# Patient Record
Sex: FEMALE | Race: ASIAN | URBAN - METROPOLITAN AREA
[De-identification: names, ages, dates, MRNs, and addresses within clinical notes are randomized per-mention and may not be internally consistent; named-entity substitution may affect disease eponyms.]

---

## 2017-03-02 ENCOUNTER — OFFICE VISIT (OUTPATIENT)
Dept: INTERNAL MEDICINE | Facility: CLINIC | Age: 52
End: 2017-03-02
Payer: COMMERCIAL

## 2017-03-02 VITALS
WEIGHT: 119 LBS | HEART RATE: 86 BPM | DIASTOLIC BLOOD PRESSURE: 90 MMHG | TEMPERATURE: 98.5 F | OXYGEN SATURATION: 99 % | HEIGHT: 60 IN | SYSTOLIC BLOOD PRESSURE: 110 MMHG | BODY MASS INDEX: 23.36 KG/M2

## 2017-03-02 DIAGNOSIS — M81.0 OSTEOPOROSIS: ICD-10-CM

## 2017-03-02 DIAGNOSIS — Z00.00 ENCOUNTER FOR ROUTINE ADULT HEALTH EXAMINATION WITHOUT ABNORMAL FINDINGS: Primary | ICD-10-CM

## 2017-03-02 DIAGNOSIS — Z12.31 VISIT FOR SCREENING MAMMOGRAM: ICD-10-CM

## 2017-03-02 LAB
ANION GAP SERPL CALCULATED.3IONS-SCNC: 10 MMOL/L (ref 3–14)
BUN SERPL-MCNC: 10 MG/DL (ref 7–30)
CALCIUM SERPL-MCNC: 8.8 MG/DL (ref 8.5–10.1)
CHLORIDE SERPL-SCNC: 106 MMOL/L (ref 94–109)
CHOLEST SERPL-MCNC: 198 MG/DL
CO2 SERPL-SCNC: 27 MMOL/L (ref 20–32)
CREAT SERPL-MCNC: 0.59 MG/DL (ref 0.52–1.04)
GFR SERPL CREATININE-BSD FRML MDRD: NORMAL ML/MIN/1.7M2
GLUCOSE SERPL-MCNC: 93 MG/DL (ref 70–99)
HDLC SERPL-MCNC: 59 MG/DL
LDLC SERPL CALC-MCNC: 110 MG/DL
NONHDLC SERPL-MCNC: 139 MG/DL
POTASSIUM SERPL-SCNC: 3.8 MMOL/L (ref 3.4–5.3)
SODIUM SERPL-SCNC: 143 MMOL/L (ref 133–144)
TRIGL SERPL-MCNC: 147 MG/DL

## 2017-03-02 PROCEDURE — 80061 LIPID PANEL: CPT | Performed by: INTERNAL MEDICINE

## 2017-03-02 PROCEDURE — 99396 PREV VISIT EST AGE 40-64: CPT | Performed by: INTERNAL MEDICINE

## 2017-03-02 PROCEDURE — 36415 COLL VENOUS BLD VENIPUNCTURE: CPT | Performed by: INTERNAL MEDICINE

## 2017-03-02 PROCEDURE — 86803 HEPATITIS C AB TEST: CPT | Performed by: INTERNAL MEDICINE

## 2017-03-02 PROCEDURE — 80048 BASIC METABOLIC PNL TOTAL CA: CPT | Performed by: INTERNAL MEDICINE

## 2017-03-02 RX ORDER — ALENDRONATE SODIUM 70 MG/1
70 TABLET ORAL
Qty: 12 TABLET | Refills: 3 | Status: SHIPPED
Start: 2017-03-02 | End: 2018-03-05

## 2017-03-02 NOTE — NURSING NOTE
"Chief Complaint   Patient presents with     Physical       Initial /90  Pulse 86  Temp 98.5  F (36.9  C) (Oral)  Ht 5' 0.24\" (1.53 m)  Wt 119 lb (54 kg)  LMP 05/25/2012  SpO2 99%  BMI 23.06 kg/m2 Estimated body mass index is 23.06 kg/(m^2) as calculated from the following:    Height as of this encounter: 5' 0.24\" (1.53 m).    Weight as of this encounter: 119 lb (54 kg).  Medication Reconciliation: complete    "

## 2017-03-02 NOTE — PATIENT INSTRUCTIONS
PREVENTIVE HEALTH RECOMMENDATIONS:     Vaccines: Get a flu shot each year. Get a tetanus shot every 10 years.     Exercise for at least 150 minutes a week (an average of 30 minutes a day, 5 days of the week). This will help you control your weight and prevent disease.    Limit alcohol to one drink per day.    No smoking.     Wear sunscreen to prevent skin cancer.     See your dentist twice a year for an exam and cleaning.    Try to get Calcium 1200 mg total per day. It is best to not take it all at once. Try to get Vitamin D at least 1000 units per day.    BMI or Body Mass Index is a way of indicating weight and health risk for cardiovascular diseases, high blood pressure, diabetes.   Definitions:    Underweight is less than 18.5 and will be associated with health risk.   Normal BMI is 18.5 to 25   Overweight is 25-29   Obesity is 30 or greater   Morbid Obesity is 40 or greater or 35 or greater with diabetes or high blood pressure  Obesity and Morbid Obesity are associated with higher health risks. Lowering calories, exercising more may lower your BMI and even small decreases can have positive impact on lowering health risks.   Your Body mass index is 23.06 kg/(m^2)..

## 2017-03-02 NOTE — PROGRESS NOTES
SUBJECTIVE:     CC: Gaby Jones is an 51 year old woman who presents for preventive health visit.     Healthy Habits:    Do you get at least three servings of calcium containing foods daily (dairy, green leafy vegetables, etc.)? yes    Amount of exercise or daily activities, outside of work: no    Problems taking medications regularly Yes didn't has health insurance for 5 months     Medication side effects: No    Have you had an eye exam in the past two years? yes    Do you see a dentist twice per year? yes    Do you have sleep apnea, excessive snoring or daytime drowsiness?no      Current concerns:   none  She stopped fosamax 6 months ago after losing insurance but will go back on now. No side effects.     Today's PHQ-2 Score:   PHQ-2 ( 1999 Pfizer) 1/21/2016 8/25/2014   Q1: Little interest or pleasure in doing things 0 0   Q2: Feeling down, depressed or hopeless 0 0   PHQ-2 Score 0 0       Abuse: Current or Past(Physical, Sexual or Emotional)- No  Do you feel safe in your environment - Yes    Social History   Substance Use Topics     Smoking status: Never Smoker     Smokeless tobacco: Never Used     Alcohol use No     none    Recent Labs   Lab Test  01/21/16   0813  08/25/14   0835  03/08/13   0729   CHOL  191  160  156   HDL  53  56  49*   LDL  119*  82  87   TRIG  93  111  102   CHOLHDLRATIO   --   2.9  3.2   NHDL  138*   --    --        Reviewed orders with patient.  Reviewed health maintenance and updated orders accordingly - Yes    Mammo Decision Support:  Patient over age 50, mutual decision to screen reflected in health maintenance.    Pertinent mammograms are reviewed under the imaging tab.  History of abnormal Pap smear: NO - age 30-65 PAP every 5 years with negative HPV co-testing recommended    Patient Active Problem List   Diagnosis     CARDIOVASCULAR SCREENING; LDL GOAL LESS THAN 160     Osteoporosis     Current Outpatient Prescriptions   Medication Sig Dispense Refill     Multiple Vitamin  "(MULTI-VITAMIN) per tablet Take 1 tablet by mouth daily. 90 tablet 3     alendronate (FOSAMAX) 70 MG tablet Take 1 tablet (70 mg) by mouth every 7 days Take with over 8 ounces water and stay upright for at least 30 minutes after dose.  Take at least 60 minutes before breakfast 12 tablet 3      Review Of Systems  Skin: negative  Eyes: negative  Ears/Nose/Throat: negative  Respiratory: No dyspnea on exertion and No cough  Cardiovascular: negative  Gastrointestinal: negative  Genitourinary: negative  Musculoskeletal: negative  Neurologic: negative  Psychiatric: negative  Hematologic/Lymphatic/Immunologic: negative  Endocrine: negative   Gynecologic ros reveals:no breast pain or new or enlarging lumps on self exam, no vaginal bleeding, no discharge or pelvic pain    Objective:  Patient alert, in no acute distress  /90  Pulse 86  Temp 98.5  F (36.9  C) (Oral)  Ht 5' 0.24\" (1.53 m)  Wt 119 lb (54 kg)  LMP 05/25/2012  SpO2 99%  BMI 23.06 kg/m2    HEENT: extraocular movements are intact, pupils equal and reactive to light and accommodation, TMs clear, oropharynx clear  NECK: Neck supple. No adenopathy. Thyroid symmetric, normal size,, Carotids without bruits.  PULMONARY: clear to auscultation  CARDIAC: regular rate and rhythm and no murmurs, clicks, or gallops  PULSES: 2/2 throughout  BACK: no spinal or CVAT  ABDOMINAL: Soft, nontender.  Normal bowel sounds.  No hepatosplenomegaly or abnormal masses  BREAST: No breast masses or tenderness, No axillary masses or tenderness and No galactorrhea  PELVIC: external genitalia normal,  bimanual exam with normal uterus and adnexa,   REFLEXES: 2+ throughout  SKIN: unremarkable       ASSESSMENT/PLAN:     1. Encounter for routine adult health examination without abnormal findings    - Hepatitis C Screen Reflex to HCV RNA Quant and Genotype  - Basic metabolic panel  - Lipid Profile with reflex to direct LDL    2. Osteoporosis  Refill med  - alendronate (FOSAMAX) 70 MG tablet; Take " "1 tablet (70 mg) by mouth every 7 days Take with over 8 ounces water and stay upright for at least 30 minutes after dose.  Take at least 60 minutes before breakfast  Dispense: 12 tablet; Refill: 3    3. Visit for screening mammogram  due  - Mammo Screening digital (bilat); Future    COUNSELING:   Reviewed preventive health counseling, as reflected in patient instructions       Consider Hep C screening for patients born between 1945 and 1965         reports that she has never smoked. She has never used smokeless tobacco.    Estimated body mass index is 22.27 kg/(m^2) as calculated from the following:    Height as of 1/21/16: 5' 0.25\" (1.53 m).    Weight as of 1/21/16: 115 lb (52.2 kg).       Counseling Resources:  ATP IV Guidelines  Pooled Cohorts Equation Calculator  Breast Cancer Risk Calculator  FRAX Risk Assessment  ICSI Preventive Guidelines  Dietary Guidelines for Americans, 2010  [employer]'s MyPlate  ASA Prophylaxis  Lung CA Screening    [facility]  "

## 2017-03-02 NOTE — MR AVS SNAPSHOT
After Visit Summary   3/2/2017    Gaby Jones    MRN: [id number]           Patient Information     Date Of Birth          1965        Visit Information        Provider Department      3/2/2017 8:20 AM Shelia Marin MD [facility]        Today's Diagnoses     Encounter for routine adult health examination without abnormal findings    -  1    Osteoporosis        Visit for screening mammogram          Care Instructions      PREVENTIVE HEALTH RECOMMENDATIONS:     Vaccines: Get a flu shot each year. Get a tetanus shot every 10 years.     Exercise for at least 150 minutes a week (an average of 30 minutes a day, 5 days of the week). This will help you control your weight and prevent disease.    Limit alcohol to one drink per day.    No smoking.     Wear sunscreen to prevent skin cancer.     See your dentist twice a year for an exam and cleaning.    Try to get Calcium 1200 mg total per day. It is best to not take it all at once. Try to get Vitamin D at least 1000 units per day.    BMI or Body Mass Index is a way of indicating weight and health risk for cardiovascular diseases, high blood pressure, diabetes.   Definitions:    Underweight is less than 18.5 and will be associated with health risk.   Normal BMI is 18.5 to 25   Overweight is 25-29   Obesity is 30 or greater   Morbid Obesity is 40 or greater or 35 or greater with diabetes or high blood pressure  Obesity and Morbid Obesity are associated with higher health risks. Lowering calories, exercising more may lower your BMI and even small decreases can have positive impact on lowering health risks.   Your Body mass index is 23.06 kg/(m^2)..           Follow-ups after your visit        Future tests that were ordered for you today     Open Future Orders        Priority Expected Expires Ordered    Mammo Screening digital (bilat) Routine  3/2/2018 3/2/2017            Who to contact     If you have questions or need follow up information  "about today's clinic visit or your schedule please contact [facility] directly at [phone].  Normal or non-critical lab and imaging results will be communicated to you by MyChart, letter or phone within 4 business days after the clinic has received the results. If you do not hear from us within 7 days, please contact the clinic through [employer] or phone. If you have a critical or abnormal lab result, we will notify you by phone as soon as possible.  Submit refill requests through [employer] or call your pharmacy and they will forward the refill request to us. Please allow 3 business days for your refill to be completed.          Additional Information About Your Visit        [employer] Information     [employer] lets you send messages to your doctor, view your test results, renew your prescriptions, schedule appointments and more. To sign up, go to [email or web address] . Click on \"Log in\" on the left side of the screen, which will take you to the Welcome page. Then click on \"Sign up Now\" on the right side of the page.     You will be asked to enter the access code listed below, as well as some personal information. Please follow the directions to create your username and password.     Your access code is: [email or web address]  Expires: 2017  8:47 AM     Your access code will  in 90 days. If you need help or a new code, please call your [place] clinic or [phone].        Care EveryWhere ID     This is your Care EveryWhere ID. This could be used by other organizations to access your [place] medical records  [id number]        Your Vitals Were     Pulse Temperature Height Last Period Pulse Oximetry BMI (Body Mass Index)    86 98.5  F (36.9  C) (Oral) 5' 0.24\" (1.53 m) 2012 99% 23.06 kg/m2       Blood Pressure from Last 3 Encounters:   17 110/90   16 98/70   09/28/15 107/67    Weight from Last 3 Encounters:   17 119 lb (54 kg)   16 115 lb (52.2 kg)   09/28/15 110 lb " (49.9 kg)              We Performed the Following     Basic metabolic panel     Hepatitis C Screen Reflex to HCV RNA Quant and Genotype     Lipid Profile with reflex to direct LDL          Where to get your medicines      These medications were sent to [facility] [address] [place], [place] - [address] AT [facility] [address]  [address] [address]     Phone:  [phone]     alendronate 70 MG tablet          Primary Care Provider Office Phone # Fax #    Shelia Marin [phone]       [facility] [address]  [facility] [address]        Thank you!     Thank you for choosing [facility]  for your care. Our goal is always to provide you with excellent care. Hearing back from our patients is one way we can continue to improve our services. Please take a few minutes to complete the written survey that you may receive in the mail after your visit with us. Thank you!             Your Updated Medication List - Protect others around you: Learn how to safely use, store and throw away your medicines at www.disposemymeds.org.          This list is accurate as of: 3/2/17  8:47 AM.  Always use your most recent med list.                   Brand Name Dispense Instructions for use    alendronate 70 MG tablet    FOSAMAX    12 tablet    Take 1 tablet (70 mg) by mouth every 7 days Take with over 8 ounces water and stay upright for at least 30 minutes after dose.  Take at least 60 minutes before breakfast       Multi-vitamin Tabs tablet   Generic drug:  multivitamin, therapeutic with minerals     90 tablet    Take 1 tablet by mouth daily.

## 2017-03-03 LAB — HCV AB SERPL QL IA: NORMAL

## 2017-03-14 ENCOUNTER — HOSPITAL ENCOUNTER (OUTPATIENT)
Dept: MAMMOGRAPHY | Facility: CLINIC | Age: 52
Discharge: HOME | End: 2017-03-14
Attending: INTERNAL MEDICINE | Admitting: INTERNAL MEDICINE
Payer: COMMERCIAL

## 2017-03-14 DIAGNOSIS — Z12.31 VISIT FOR SCREENING MAMMOGRAM: ICD-10-CM

## 2017-03-14 PROCEDURE — G0202 SCR MAMMO BI INCL CAD: HCPCS

## 2017-03-14 PROCEDURE — 77063 BREAST TOMOSYNTHESIS BI: CPT

## 2018-03-05 DIAGNOSIS — M81.0 AGE-RELATED OSTEOPOROSIS WITHOUT CURRENT PATHOLOGICAL FRACTURE: Primary | ICD-10-CM

## 2018-03-05 NOTE — LETTER
[facility]  [address], [address]  [place], [place]  [address]                                            TEL:[phone]  FAX:[phone]      Gaby MCDOWELL Karen  [address] [facility] [address]      March 7, 2018    Dear Gaby Jung       We have received a refill request from your pharmacy, however, we were only able to provide a one time fill because you are due for an appointment. Please call [phone] to schedule an appointment before you are due for your next refill.      Sincerely,  Melody, RN - Triage Nurse

## 2018-03-07 NOTE — TELEPHONE ENCOUNTER
"Requested Prescriptions   Pending Prescriptions Disp Refills     alendronate (FOSAMAX) 70 MG tablet 12 tablet 3     Sig: Take 1 tablet (70 mg) by mouth every 7 days Take with over 8 ounces water and stay upright for at least 30 minutes after dose.  Take at least 60 minutes before breakfast    Bisphosphonates Failed    3/5/2018 11:13 AM       Failed - Recent (12 mo) or future (30 days) visit within the authorizing provider's specialty    Patient had office visit in the last year or has a visit in the next 30 days with authorizing provider.  See \"Patient Info\" tab in inbasket, or \"Choose Columns\" in Meds & Orders section of the refill encounter.   Last OV: 03/02/17        Failed - Dexa on file within past 2 years    Please review last Dexa result.   Last DX: 09/10/14, per results: f/u recommended in 2 years       Failed - Normal serum creatinine on file within past 12 months    Recent Labs   Lab Test  03/02/17   0854   CR  0.59          Passed - Patient is age 18 or older        Medication is being filled for 1 time refill only due to:  due for appt   Mailed letter.    Routed to MD to print rx and sign d/t long instructions.    "

## 2018-03-08 RX ORDER — ALENDRONATE SODIUM 70 MG/1
70 TABLET ORAL
Qty: 4 TABLET | Refills: 0 | Status: SHIPPED | OUTPATIENT
Start: 2018-03-08

## 2018-03-13 NOTE — TELEPHONE ENCOUNTER
Spoke to patient and she now has [employer] [facility] and will be changing clinics.

## 2018-04-08 DIAGNOSIS — M81.0 AGE-RELATED OSTEOPOROSIS WITHOUT CURRENT PATHOLOGICAL FRACTURE: ICD-10-CM

## 2018-04-10 RX ORDER — ALENDRONATE SODIUM 70 MG/1
TABLET ORAL
Qty: 4 TABLET | Refills: 0 | OUTPATIENT
Start: 2018-04-10